# Patient Record
Sex: MALE | Race: WHITE | Employment: FULL TIME
[De-identification: names, ages, dates, MRNs, and addresses within clinical notes are randomized per-mention and may not be internally consistent; named-entity substitution may affect disease eponyms.]

---

## 2018-05-23 PROBLEM — F41.9 ANXIETY: Status: ACTIVE | Noted: 2018-05-23

## 2018-05-23 PROBLEM — L72.3 SEBACEOUS CYST: Status: ACTIVE | Noted: 2018-05-23

## 2018-05-23 PROBLEM — J30.9 ALLERGIC RHINITIS: Status: ACTIVE | Noted: 2018-05-23

## 2018-05-23 PROBLEM — G43.909 MIGRAINE HEADACHE: Status: ACTIVE | Noted: 2018-05-23

## 2018-05-23 PROBLEM — S42.402A FRACTURE OF LEFT ELBOW: Status: ACTIVE | Noted: 2018-05-23

## 2018-05-23 PROBLEM — A60.00 GENITAL HERPES: Status: ACTIVE | Noted: 2018-05-23

## 2018-05-23 PROBLEM — B00.89 HERPES DERMATITIS: Status: ACTIVE | Noted: 2018-05-23

## 2018-05-23 PROBLEM — J45.909 ASTHMA: Status: ACTIVE | Noted: 2018-05-23

## 2018-05-23 PROBLEM — E78.89 LIPIDS ABNORMAL: Status: ACTIVE | Noted: 2018-05-23

## 2018-05-26 PROBLEM — J30.1 CHRONIC SEASONAL ALLERGIC RHINITIS DUE TO POLLEN: Status: ACTIVE | Noted: 2018-05-26

## 2019-03-20 PROBLEM — Z86.19 HISTORY OF HERPES GENITALIS: Status: ACTIVE | Noted: 2019-03-20

## 2020-03-18 PROBLEM — L20.84 INTRINSIC ECZEMA: Status: ACTIVE | Noted: 2020-03-18

## 2020-03-18 PROBLEM — R03.0 ELEVATED BLOOD PRESSURE READING: Status: ACTIVE | Noted: 2020-03-18

## 2020-09-30 PROBLEM — Z20.822 ENCOUNTER FOR LABORATORY TESTING FOR COVID-19 VIRUS: Status: ACTIVE | Noted: 2020-07-15

## 2020-09-30 PROBLEM — J45.909 ASTHMA: Status: RESOLVED | Noted: 2018-05-23 | Resolved: 2020-09-30

## 2021-01-13 ENCOUNTER — NURSE TRIAGE (OUTPATIENT)
Dept: OTHER | Facility: CLINIC | Age: 41
End: 2021-01-13

## 2021-01-13 NOTE — TELEPHONE ENCOUNTER
Reason for Disposition   [1] MODERATE difficulty breathing (e.g., speaks in phrases, SOB even at rest, pulse 100-120) AND [2] NEW-onset or WORSE than normal    Answer Assessment - Initial Assessment Questions  1. RESPIRATORY STATUS: \"Describe your breathing? \" (e.g., wheezing, shortness of breath, unable to speak, severe coughing)       Pt is covid positive for 9 days, SOB with exertion statrted lst night. 2. ONSET: \"When did this breathing problem begin? \"       Last night    3. PATTERN \"Does the difficult breathing come and go, or has it been constant since it started? \"       Constant with exertion    4. SEVERITY: \"How bad is your breathing? \" (e.g., mild, moderate, severe)     - MILD: No SOB at rest, mild SOB with walking, speaks normally in sentences, can lay down, no retractions, pulse < 100.     - MODERATE: SOB at rest, SOB with minimal exertion and prefers to sit, cannot lie down flat, speaks in phrases, mild retractions, audible wheezing, pulse 100-120.     - SEVERE: Very SOB at rest, speaks in single words, struggling to breathe, sitting hunched forward, retractions, pulse > 120       Moderate    5. RECURRENT SYMPTOM: \"Have you had difficulty breathing before? \" If so, ask: \"When was the last time? \" and \"What happened that time? \"       Never had prior to covid. 6. CARDIAC HISTORY: \"Do you have any history of heart disease? \" (e.g., heart attack, angina, bypass surgery, angioplasty)       Pre HTN    7. LUNG HISTORY: \"Do you have any history of lung disease? \"  (e.g., pulmonary embolus, asthma, emphysema)      Denies any hx    8. CAUSE: \"What do you think is causing the breathing problem? \"       Covid     9. OTHER SYMPTOMS: \"Do you have any other symptoms? (e.g., dizziness, runny nose, cough, chest pain, fever)      SOB with exertion, coughing, fever,faitgue    10. PREGNANCY: \"Is there any chance you are pregnant? \" \"When was your last menstrual period? \"        N/A    11.  TRAVEL: \"Have you traveled out of the country in the last month? \" (e.g., travel history, exposures)        None    Protocols used: BREATHING DIFFICULTY-ADULT-AH     Brief description of triage: Pt covid + for 9 days. States SOB with exertion started yesterday. O2 level ranges from 82-93 per pt. Pt is having SOB while sitting and talking on the phone with triage. Triage indicates for patient to go to ED. Pt has someone to take him. Care advice provided, patient verbalizes understanding; denies any other questions or concerns; instructed to call back for any new or worsening symptoms. This triage is a result of a call to [address] [place] [address]. Please do not respond to the triage nurse through this encounter. Any subsequent communication should be directly with the patient.

## 2021-01-19 PROBLEM — U07.1 BRONCHITIS DUE TO COVID-19 VIRUS: Status: ACTIVE | Noted: 2021-01-19

## 2021-01-19 PROBLEM — J40 BRONCHITIS DUE TO COVID-19 VIRUS: Status: ACTIVE | Noted: 2021-01-19

## 2021-09-13 ENCOUNTER — NURSE TRIAGE (OUTPATIENT)
Dept: OTHER | Facility: CLINIC | Age: 41
End: 2021-09-13

## 2021-09-13 NOTE — TELEPHONE ENCOUNTER
Recommend POD schedule or urgent care today, to ER if severe symptoms. Patient should be assigned a new PCP.     Sabas Huerta MD

## 2021-09-13 NOTE — TELEPHONE ENCOUNTER
Reason for Disposition   Patient wants to be seen    Answer Assessment - Initial Assessment Questions  1. SYMPTOM: \"What is the main symptom you are concerned about? \" (e.g., weakness, numbness)      Right hand numbness    2. ONSET: \"When did this start? \" (minutes, hours, days; while sleeping)      Saturday morning woke up with tingling and numbness    3. LAST NORMAL: \"When was the last time you were normal (no symptoms)? \"      Friday night    4. PATTERN \"Does this come and go, or has it been constant since it started? \"  \"Is it present now? \"      Numbness is constant    5. CARDIAC SYMPTOMS: \"Have you had any of the following symptoms: chest pain, difficulty breathing, palpitations? \"      None    6. NEUROLOGIC SYMPTOMS: \"Have you had any of the following symptoms: headache, dizziness, vision loss, double vision, changes in speech, unsteady on your feet? \"      None    7. OTHER SYMPTOMS: \"Do you have any other symptoms? \"      None    8. PREGNANCY: \"Is there any chance you are pregnant? \" \"When was your last menstrual period? \"      NO    Protocols used: NEUROLOGIC DEFICIT-ADULT-[place]  Brief description of triage: right hand numbness and tingling for the last three days. Triage indicates for patient to be seen by provider today    Care advice provided, patient verbalizes understanding; denies any other questions or concerns; instructed to call back for any new or worsening symptoms. This triage is a result of a call to [address] [place] [address]. Please do not respond to the triage nurse through this encounter. Any subsequent communication should be directly with the patient.

## 2021-09-16 NOTE — TELEPHONE ENCOUNTER
Per Chart review, patient was seen at [facility] urgent care for sx on 9/14/21.  Nothing further needed